# Patient Record
Sex: FEMALE | Race: BLACK OR AFRICAN AMERICAN
[De-identification: names, ages, dates, MRNs, and addresses within clinical notes are randomized per-mention and may not be internally consistent; named-entity substitution may affect disease eponyms.]

---

## 2018-01-25 ENCOUNTER — HOSPITAL ENCOUNTER (OUTPATIENT)
Dept: HOSPITAL 76 - DI | Age: 63
Discharge: HOME | End: 2018-01-25
Attending: OBSTETRICS & GYNECOLOGY
Payer: COMMERCIAL

## 2018-01-25 DIAGNOSIS — Z12.31: Primary | ICD-10-CM

## 2018-01-25 PROCEDURE — 77067 SCR MAMMO BI INCL CAD: CPT

## 2018-01-26 NOTE — MAMMOGRAPHY REPORT
DATE OF SERVICE: 01/25/2018

 

DIGITAL SCREENING MAMMOGRAM:  01/25/2018

 

CLINICAL INDICATION:  A 62-year-old, for screening.

 

COMPARISON:  12/2016, 07/2015, 06/2014, 06/2013, 04/2012, 01/2010.

 

TECHNIQUE:  Routine CC and MLO projections were obtained of the breasts.

 

FINDINGS:  The breasts demonstrate scattered fibroglandular densities bilaterally.  Coarse and 

punctate, typically benign calcifications are present. No suspicious masses, clustered 

microcalcifications, or regions of architectural distortion are identified.

 

IMPRESSION:  BENIGN FINDINGS.

RECOMMENDATION:  ROUTINE ANNUAL SCREENING UNLESS OTHERWISE CLINICALLY INDICATED.

BIRADS CATEGORY 2-BENIGN FINDINGS.

STANDARD QUALIFYING STATEMENTS:

1.  This examination was reviewed with the aid of Computer-Aided Detection (CAD).

2.  A negative or benign imaging report should not delay biopsy if clinically suspicious 

findings are present.  Consider surgical consultation if warranted.  More than 5% of cancers 

are not identified by imaging.

3.  Dense breasts may obscure an underlying neoplasm.

 

 

 

DD: 01/26/2018 15:22

TD: 01/26/2018 18:31

Job #: [id number]

## 2020-03-20 ENCOUNTER — HOSPITAL ENCOUNTER (OUTPATIENT)
Dept: HOSPITAL 76 - DI | Age: 65
Discharge: HOME | End: 2020-03-20
Attending: GENERAL ACUTE CARE HOSPITAL
Payer: COMMERCIAL

## 2020-03-20 DIAGNOSIS — Z12.31: Primary | ICD-10-CM

## 2020-03-20 PROCEDURE — 77067 SCR MAMMO BI INCL CAD: CPT

## 2020-03-20 PROCEDURE — 77063 BREAST TOMOSYNTHESIS BI: CPT

## 2020-03-24 NOTE — MAMMOGRAPHY REPORT
Reason:  ROUTINE MAMMO

Procedure Date:  03/20/2020   

Accession Number:  [id number] / [id number]                    

Procedure:  VASILE - Screening Mammo w/Osiel CPT Code:  

 

***Final Report***

 

 

FULL RESULT:

 

 

EXAM: Screening Mammo w/Osiel

 

DATE: 3/20/2020 10:53 AM

 

CLINICAL HISTORY:  Routine screening

 

TECHNIQUE: (B) - Bilateral  CC and MLO views were obtained.

 

COMPARISON: 1/25/2018, 12/16/2016, 7/14/2015, 6/13/2014, 6/11/2013, 

4/12/2012

 

PARENCHYMAL PATTERN: (A) - The breasts demonstrate scattered 

fibroglandular densities bilaterally.

 

FINDINGS:

No significant interval change. There are no suspicious masses, 

calcifications, or areas of distortion.

 

IMPRESSION: Negative examination. BI-RADS category 1.

 

RECOMMENDATION: (ANNUAL)  - Recommend routine annual screening 

mammography.

 

BI-RADS CATEGORY: (1) - Negative.

 

STANDARD QUALIFYING STATEMENTS:

1.  This examination was not reviewed with the aid of Computer-Aided 

Detection (CAD).

2.  A negative or benign  imaging report should not preclude biopsy if 

clinically suspicious findings are present.

3.  Dense breasts may obscure an underlying neoplasm.

4. This examination was reviewed with the aid of 3D breast imaging 

(tomosynthesis).